# Patient Record
Sex: FEMALE | URBAN - METROPOLITAN AREA
[De-identification: names, ages, dates, MRNs, and addresses within clinical notes are randomized per-mention and may not be internally consistent; named-entity substitution may affect disease eponyms.]

---

## 2017-01-03 ENCOUNTER — APPOINTMENT (RX ONLY)
Dept: URBAN - METROPOLITAN AREA CLINIC 46 | Facility: CLINIC | Age: 32
Setting detail: DERMATOLOGY
End: 2017-01-03

## 2017-01-03 DIAGNOSIS — Z41.9 ENCOUNTER FOR PROCEDURE FOR PURPOSES OTHER THAN REMEDYING HEALTH STATE, UNSPECIFIED: ICD-10-CM

## 2017-01-03 PROCEDURE — ? CHEMICAL PEEL

## 2017-01-03 ASSESSMENT — LOCATION SIMPLE DESCRIPTION DERM
LOCATION SIMPLE: RIGHT FOREHEAD
LOCATION SIMPLE: RIGHT CHEEK
LOCATION SIMPLE: LEFT CHEEK
LOCATION SIMPLE: CHIN

## 2017-01-03 ASSESSMENT — LOCATION ZONE DERM: LOCATION ZONE: FACE

## 2017-01-03 ASSESSMENT — LOCATION DETAILED DESCRIPTION DERM
LOCATION DETAILED: RIGHT CENTRAL MALAR CHEEK
LOCATION DETAILED: RIGHT MENTAL CREASE
LOCATION DETAILED: RIGHT FOREHEAD
LOCATION DETAILED: LEFT CENTRAL MALAR CHEEK

## 2017-01-03 NOTE — PROCEDURE: CHEMICAL PEEL
Chemical Peel: Ren
Detail Level: Zone
Treatment Number: 0
Prep: The treated area was degreased with pre-peel cleanser, and vaseline was applied for protection of mucous membranes.
Consent: Prior to the procedure, written consent was obtained and risks were reviewed, including but not limited to: redness, peeling, blistering, pigmentary change, scarring, infection, and pain.
Price (Use Numbers Only, No Special Characters Or $): 95.00
Post-Care Instructions: I reviewed with the patient in detail post-care instructions. Patient should avoid sun exposure and wear sun protection.
Post Peel Care: After the procedure, a post-peel cream was applied to the treated areas. Sun protection and post-care instructions were reviewed with the patient.